# Patient Record
Sex: FEMALE | Race: WHITE | NOT HISPANIC OR LATINO | URBAN - METROPOLITAN AREA
[De-identification: names, ages, dates, MRNs, and addresses within clinical notes are randomized per-mention and may not be internally consistent; named-entity substitution may affect disease eponyms.]

---

## 2019-11-11 ENCOUNTER — EMERGENCY (EMERGENCY)
Facility: HOSPITAL | Age: 74
LOS: 1 days | Discharge: PSYCHIATRIC OR SUBSTANCE USE FACILITY | End: 2019-11-11
Attending: EMERGENCY MEDICINE | Admitting: EMERGENCY MEDICINE
Payer: COMMERCIAL

## 2019-11-11 VITALS
DIASTOLIC BLOOD PRESSURE: 83 MMHG | TEMPERATURE: 98 F | SYSTOLIC BLOOD PRESSURE: 129 MMHG | OXYGEN SATURATION: 95 % | HEIGHT: 63 IN | HEART RATE: 103 BPM | RESPIRATION RATE: 16 BRPM | WEIGHT: 119.93 LBS

## 2019-11-11 VITALS — HEART RATE: 76 BPM | DIASTOLIC BLOOD PRESSURE: 85 MMHG | SYSTOLIC BLOOD PRESSURE: 145 MMHG | RESPIRATION RATE: 18 BRPM

## 2019-11-11 DIAGNOSIS — F33.2 MAJOR DEPRESSIVE DISORDER, RECURRENT SEVERE WITHOUT PSYCHOTIC FEATURES: ICD-10-CM

## 2019-11-11 DIAGNOSIS — F41.1 GENERALIZED ANXIETY DISORDER: ICD-10-CM

## 2019-11-11 LAB
ALBUMIN SERPL ELPH-MCNC: 3.6 G/DL — SIGNIFICANT CHANGE UP (ref 3.3–5)
ALP SERPL-CCNC: 89 U/L — SIGNIFICANT CHANGE UP (ref 30–120)
ALT FLD-CCNC: 44 U/L DA — SIGNIFICANT CHANGE UP (ref 10–60)
ANION GAP SERPL CALC-SCNC: 8 MMOL/L — SIGNIFICANT CHANGE UP (ref 5–17)
APAP SERPL-MCNC: <1 UG/ML — LOW (ref 10–30)
APPEARANCE UR: CLEAR — SIGNIFICANT CHANGE UP
AST SERPL-CCNC: 28 U/L — SIGNIFICANT CHANGE UP (ref 10–40)
BASOPHILS # BLD AUTO: 0.06 K/UL — SIGNIFICANT CHANGE UP (ref 0–0.2)
BASOPHILS NFR BLD AUTO: 0.8 % — SIGNIFICANT CHANGE UP (ref 0–2)
BILIRUB SERPL-MCNC: 0.3 MG/DL — SIGNIFICANT CHANGE UP (ref 0.2–1.2)
BILIRUB UR-MCNC: NEGATIVE — SIGNIFICANT CHANGE UP
BUN SERPL-MCNC: 17 MG/DL — SIGNIFICANT CHANGE UP (ref 7–23)
CALCIUM SERPL-MCNC: 9.4 MG/DL — SIGNIFICANT CHANGE UP (ref 8.4–10.5)
CHLORIDE SERPL-SCNC: 104 MMOL/L — SIGNIFICANT CHANGE UP (ref 96–108)
CO2 SERPL-SCNC: 25 MMOL/L — SIGNIFICANT CHANGE UP (ref 22–31)
COLOR SPEC: YELLOW — SIGNIFICANT CHANGE UP
CREAT SERPL-MCNC: 0.98 MG/DL — SIGNIFICANT CHANGE UP (ref 0.5–1.3)
DIFF PNL FLD: ABNORMAL
EOSINOPHIL # BLD AUTO: 0.1 K/UL — SIGNIFICANT CHANGE UP (ref 0–0.5)
EOSINOPHIL NFR BLD AUTO: 1.3 % — SIGNIFICANT CHANGE UP (ref 0–6)
ETHANOL SERPL-MCNC: <3 MG/DL — SIGNIFICANT CHANGE UP (ref 0–3)
GLUCOSE SERPL-MCNC: 113 MG/DL — HIGH (ref 70–99)
GLUCOSE UR QL: NEGATIVE MG/DL — SIGNIFICANT CHANGE UP
HCT VFR BLD CALC: 43.2 % — SIGNIFICANT CHANGE UP (ref 34.5–45)
HGB BLD-MCNC: 14.3 G/DL — SIGNIFICANT CHANGE UP (ref 11.5–15.5)
IMM GRANULOCYTES NFR BLD AUTO: 0.3 % — SIGNIFICANT CHANGE UP (ref 0–1.5)
KETONES UR-MCNC: ABNORMAL
LEUKOCYTE ESTERASE UR-ACNC: ABNORMAL
LYMPHOCYTES # BLD AUTO: 1.82 K/UL — SIGNIFICANT CHANGE UP (ref 1–3.3)
LYMPHOCYTES # BLD AUTO: 24.4 % — SIGNIFICANT CHANGE UP (ref 13–44)
MCHC RBC-ENTMCNC: 28.6 PG — SIGNIFICANT CHANGE UP (ref 27–34)
MCHC RBC-ENTMCNC: 33.1 GM/DL — SIGNIFICANT CHANGE UP (ref 32–36)
MCV RBC AUTO: 86.4 FL — SIGNIFICANT CHANGE UP (ref 80–100)
MONOCYTES # BLD AUTO: 0.66 K/UL — SIGNIFICANT CHANGE UP (ref 0–0.9)
MONOCYTES NFR BLD AUTO: 8.9 % — SIGNIFICANT CHANGE UP (ref 2–14)
NEUTROPHILS # BLD AUTO: 4.79 K/UL — SIGNIFICANT CHANGE UP (ref 1.8–7.4)
NEUTROPHILS NFR BLD AUTO: 64.3 % — SIGNIFICANT CHANGE UP (ref 43–77)
NITRITE UR-MCNC: NEGATIVE — SIGNIFICANT CHANGE UP
NRBC # BLD: 0 /100 WBCS — SIGNIFICANT CHANGE UP (ref 0–0)
PCP SPEC-MCNC: SIGNIFICANT CHANGE UP
PH UR: 6 — SIGNIFICANT CHANGE UP (ref 5–8)
PLATELET # BLD AUTO: 349 K/UL — SIGNIFICANT CHANGE UP (ref 150–400)
POTASSIUM SERPL-MCNC: 3.5 MMOL/L — SIGNIFICANT CHANGE UP (ref 3.5–5.3)
POTASSIUM SERPL-SCNC: 3.5 MMOL/L — SIGNIFICANT CHANGE UP (ref 3.5–5.3)
PROT SERPL-MCNC: 7.4 G/DL — SIGNIFICANT CHANGE UP (ref 6–8.3)
PROT UR-MCNC: 30 MG/DL
RBC # BLD: 5 M/UL — SIGNIFICANT CHANGE UP (ref 3.8–5.2)
RBC # FLD: 13.1 % — SIGNIFICANT CHANGE UP (ref 10.3–14.5)
SALICYLATES SERPL-MCNC: 0.9 MG/DL — LOW (ref 2.8–20)
SODIUM SERPL-SCNC: 137 MMOL/L — SIGNIFICANT CHANGE UP (ref 135–145)
SP GR SPEC: 1.01 — SIGNIFICANT CHANGE UP (ref 1.01–1.02)
TSH SERPL-MCNC: 1.81 UIU/ML — SIGNIFICANT CHANGE UP (ref 0.27–4.2)
UROBILINOGEN FLD QL: NEGATIVE MG/DL — SIGNIFICANT CHANGE UP
WBC # BLD: 7.45 K/UL — SIGNIFICANT CHANGE UP (ref 3.8–10.5)
WBC # FLD AUTO: 7.45 K/UL — SIGNIFICANT CHANGE UP (ref 3.8–10.5)

## 2019-11-11 PROCEDURE — 93005 ELECTROCARDIOGRAM TRACING: CPT

## 2019-11-11 PROCEDURE — 99284 EMERGENCY DEPT VISIT MOD MDM: CPT

## 2019-11-11 PROCEDURE — 85027 COMPLETE CBC AUTOMATED: CPT

## 2019-11-11 PROCEDURE — 81001 URINALYSIS AUTO W/SCOPE: CPT

## 2019-11-11 PROCEDURE — 99285 EMERGENCY DEPT VISIT HI MDM: CPT

## 2019-11-11 PROCEDURE — 90792 PSYCH DIAG EVAL W/MED SRVCS: CPT | Mod: GT

## 2019-11-11 PROCEDURE — 80053 COMPREHEN METABOLIC PANEL: CPT

## 2019-11-11 PROCEDURE — 84443 ASSAY THYROID STIM HORMONE: CPT

## 2019-11-11 PROCEDURE — 36415 COLL VENOUS BLD VENIPUNCTURE: CPT

## 2019-11-11 PROCEDURE — 80307 DRUG TEST PRSMV CHEM ANLYZR: CPT

## 2019-11-11 PROCEDURE — 93010 ELECTROCARDIOGRAM REPORT: CPT

## 2019-11-11 RX ORDER — CLONAZEPAM 1 MG
0.5 TABLET ORAL ONCE
Refills: 0 | Status: DISCONTINUED | OUTPATIENT
Start: 2019-11-11 | End: 2019-11-11

## 2019-11-11 RX ADMIN — Medication 0.5 MILLIGRAM(S): at 17:36

## 2019-11-11 NOTE — ED BEHAVIORAL HEALTH ASSESSMENT NOTE - PSYCHIATRIC ISSUES AND PLAN (INCLUDE STANDING AND PRN MEDICATION)
defer to inpatient team as patient reports current medication to be ineffective.  Can give klonopin 0.25mg tid PRN for anxiety

## 2019-11-11 NOTE — ED ADULT TRIAGE NOTE - CHIEF COMPLAINT QUOTE
according to daughter pt is tearful & anxious. Not eating. Recently hospitalized @ [facility] for 1 week- "only saw Psy once & I think she needs meds adjusted"

## 2019-11-11 NOTE — ED BEHAVIORAL HEALTH ASSESSMENT NOTE - DESCRIPTION
ED Course: Pt was in ED ~2  hours prior to telepsychiatry consult which was requested at 14:50 and started at 15:06.  Per RN  Prescillo pt. arrived  brought in by daughter who reports that pt. was recently discharged from inpatient psych unit at [facility] and has been crying uncontrollably and not eating.  Pt. has been cooperative, complied with triage process; provided routine labs and urine willingly, allowed gowning and security wanding without incident. Pt. has been in behavioral control and has not required medication or security intervention.  Pt. endorses SI, states “I can’t do this anymore”, denies HI, denies AH/VH. Per RN Prescillo pt. eye contact fair, speech noted to be soft in volume but of normal rate, thoughts seeming to be illogical.  Pt’s mood described as notably anxious with congruent affect; pt. very tearful. Pt. had also been tremulous initially but has been resting comfortably. Pt’s daughter was at bedside earlier in ED stay but has since departed to [facility] for pt’s grandchildren.  Interactions between pt. and daughter appeared to be supportive per RN. bladder ca 1.5 years ago sp resection, gallbladder removal, glaucoma per HPI

## 2019-11-11 NOTE — ED ADULT NURSE NOTE - NSIMPLEMENTINTERV_GEN_ALL_ED
Implemented All Fall Risk Interventions:  [place] to call system. Call bell, personal items and telephone within reach. Instruct patient to call for assistance. Room bathroom lighting operational. Non-slip footwear when patient is off stretcher. Physically safe environment: no spills, clutter or unnecessary equipment. Stretcher in lowest position, wheels locked, appropriate side rails in place. Provide visual cue, wrist band, yellow gown, etc. Monitor gait and stability. Monitor for mental status changes and reorient to person, place, and time. Review medications for side effects contributing to fall risk. Reinforce activity limits and safety measures with patient and family.

## 2019-11-11 NOTE — ED BEHAVIORAL HEALTH ASSESSMENT NOTE - ACTIVATING EVENTS/STRESSORS
Perceived burden on family or others/Non-compliant or not receiving treatment/Recent inpatient discharge/Current or pending social isolation/Hopeless about or dissatisfied with provider or treatment/Inadequate social supports

## 2019-11-11 NOTE — ED BEHAVIORAL HEALTH ASSESSMENT NOTE - HPI (INCLUDE ILLNESS QUALITY, SEVERITY, DURATION, TIMING, CONTEXT, MODIFYING FACTORS, ASSOCIATED SIGNS AND SYMPTOMS)
74 year old white female, retired, domiciled along, h/o depression, anxiety, no prior SA, one prior admission at [facility] (10/19/19 - 10/25/19) for anxiety and SI, 1 CPEP visit at [facility] few days ago, no substance use/legal history/ACS involvement/aggression, with history of bladder cancer (1.5 years ago) sp surgery, glaucoma, gallbladder removal,  presents to ED for worsening anxiety, crying, and stating "I don't want to live anymore".      Per information given to ED provider by patient daughter, prior to admission at [facility], patient was seen by PCP 6 different times and then recommended to go to [facility]. had multiple tests performed which did not show anything.  Patient admitted to starving herself in attempt to kill herself, was transferred to [facility] psych [facility] and admitted for a week.   She diagnosed with depression and anxiety, was started on trazadone, remeron, and xanax, she did not follow up with psychiatrist and counselor outpatient as instructed in discharge plan as patient is now refusing to leave house or go to the doctor's). Her arranged for her to have an aide with her. a few days ago, patient had crying spell and couldn't stop crying. Aid called EMS and transferred to [place], she was observed in CPEP for 48 hours, but not admitted to psych [facility].  Daughter took patient home and has been living with her past few days. woke up today and can't stop crying and ranting per daughter, continues to tell family every day that "I don't want to live anymore". plan is to starve herself.  Currently refusing to eat. daughter reports 40 lb weight loss in past few month.      Patient is very anxious on evaluation, but aaox3, no signs of confusion or delirium.  She states started having a few surgical operation over the past 2 years, and noticed feeling more anxious and depressed about few months ago.  She describes having constant worries over everything, racing thoughts, disastrous thinking, restless, frequent crying spells, unable to sleep at night due to racing thought, and feels very tired during the day.  She also reports feeling depressed, lack of energy, motivation, hopelessness, and does not feel hungry nor want to eat.  She has been thinking about starving herself, and has lost 40 lbs over the past few months.  She denied other plan/intent.  Denied HI/AVH.  She states feeling frightened when she was at [facility] and at [place] ED due to other patients were screaming and yelling.  She reports being prescribed Xanax, Remeron, and trazodone, and reported slight improvement in sleep, and felt ok at night when she took her medication.  However, as soon as she woke up in the morning, she reported extreme anxiety, that prevents her from normal daily function such as caring for self, cooking, care for ADL's, or going out to see doctors.  She reports reason for this visit to be "my daughter does not want to deal with me anymore.  I am such a burden."  Patient also reported stopped taking psychiatric medication few days after she was discharged from [facility].     Collateral information was obtained from [facility] psychiatrist, who states that patient was transferred to [facility] from [facility] for psychiatric admission on 10/19 for similar presentation of having increased anxiety, feeling frightened, tearful, and depression with SI.  She was started on Remeron, then ambien and vistaril for sleep which did not work and was switched to trazodone, and Xanax PRN for anxiety.  She was referred for outpatient follow up, at [facility] in [facility].  Her treating physician at [facility] was Dr. Vail and her discharge diagnosis was MDD with anxious distress, and discharge medication are as follow:  Remeron 15mg qhs, Trazodone 50mg qhs, Xanax 0.25mg tid PRN anxiety    Attempted to contact patient's daughter but voicemail is full.

## 2019-11-11 NOTE — ED ADULT NURSE NOTE - CHIEF COMPLAINT QUOTE
according to daughter pt is tearful & anxious. Not eating. Recently hospitalized @ [facility] for 1 week- "only saw Psy once & I think she needs meds adjusted"

## 2019-11-11 NOTE — ED BEHAVIORAL HEALTH ASSESSMENT NOTE - SUMMARY
74 year old white female, retired, domiciled along, h/o depression, anxiety, no prior SA, one prior admission at [facility] (10/19/19 - 10/25/19) for anxiety and SI, 1 CPEP visit at [facility] few days ago, no substance use/legal history/ACS involvement/aggression, with history of bladder cancer (1.5 years ago) sp surgery, glaucoma, gallbladder removal,  presents to ED for worsening anxiety, crying, and stating "I don't want to live anymore".   Patient presented with overt anxiety, tearfulness, endorsed to depressed mood with SI to starve herself, anxiety and depression are at an extent impairing patient's function as she lost 40 lbs within few months due to starvation, inability to sleep, does not get out of house due to anxiety, and unable to care for self.  based on above findings, patient meets criteria for inpatient level of psychiatric care under involuntary legal status for safety and stabilization .

## 2019-11-11 NOTE — ED PROVIDER NOTE - PROGRESS NOTE DETAILS
Matthieu HENDRICKS for ED attending, Dr. Camarena : 75 y/o female recently dx with depression, was hospitalized at [facility], started on trazodone, remeron, xanax, BIB daughter for refusal to take meds or follow up with outpt psych care, pt stated that she will starve herself to death because she doesnt want to live anymore. Hx comes from daughter. Pt not answering questions.  PE: VSS, afebrile, NAD, heart and lungs nl, abd soft, neuro no deficits  plan - medical clearance and telepsych eval. spoke with telepsych. will consult. accepted for transfer to [facility]

## 2019-11-11 NOTE — ED BEHAVIORAL HEALTH ASSESSMENT NOTE - RISK ASSESSMENT
Patient has elevated acute and chronic risk due to active SI with plan and intent, unable to contract for safety, severe anxiety, impaired function, noncompliant with medication/treatment, feeling hopeless, isolative. High Acute Suicide Risk

## 2019-11-11 NOTE — ED BEHAVIORAL HEALTH ASSESSMENT NOTE - SUICIDE RISK FACTORS
Anhedonia/Unable to engage in safety planning/Agitation/Severe Anxiety/Panic/Insomnia/Mood Disorder current/past/Current mood episode/Hopelessness or despair

## 2019-11-11 NOTE — ED PROVIDER NOTE - CLINICAL SUMMARY MEDICAL DECISION MAKING FREE TEXT BOX
From: Rachel Ayala  To: Sis Medina MD  Sent: 6/16/2017 9:03 PM CDT  Subject: tests    I did have my fasting lipid panel done-forgot.Beatriz Ayala  
Message seen by MD.   
Routed to Dr. Medina as FREDIS  
worsening anxiety/depression, +SI, needs medical clearance for telepsychiatry consult

## 2019-11-11 NOTE — ED BEHAVIORAL HEALTH ASSESSMENT NOTE - DETAILS
[facility] 10/19-10/25/19 Patient reports SI with plan and intent to starve herself x a few months handoff provided to [place] ED providers are notified see HPI

## 2019-11-12 RX ORDER — ALPRAZOLAM 0.25 MG
1 TABLET ORAL
Qty: 0 | Refills: 0 | DISCHARGE

## 2019-11-12 RX ORDER — MIRTAZAPINE 45 MG/1
1 TABLET, ORALLY DISINTEGRATING ORAL
Qty: 0 | Refills: 0 | DISCHARGE

## 2019-11-12 RX ORDER — TRAZODONE HCL 50 MG
0 TABLET ORAL
Qty: 0 | Refills: 0 | DISCHARGE

## 2019-11-28 ENCOUNTER — EMERGENCY (EMERGENCY)
Facility: HOSPITAL | Age: 74
LOS: 1 days | Discharge: HOME | End: 2019-11-28
Attending: EMERGENCY MEDICINE
Payer: COMMERCIAL

## 2019-11-28 VITALS
OXYGEN SATURATION: 98 % | DIASTOLIC BLOOD PRESSURE: 78 MMHG | RESPIRATION RATE: 19 BRPM | HEART RATE: 78 BPM | SYSTOLIC BLOOD PRESSURE: 170 MMHG

## 2019-11-28 VITALS
HEART RATE: 64 BPM | DIASTOLIC BLOOD PRESSURE: 71 MMHG | SYSTOLIC BLOOD PRESSURE: 134 MMHG | RESPIRATION RATE: 18 BRPM | OXYGEN SATURATION: 96 % | TEMPERATURE: 98 F

## 2019-11-28 LAB
ALBUMIN SERPL ELPH-MCNC: 3.4 G/DL — SIGNIFICANT CHANGE UP (ref 3.3–5.2)
ALP SERPL-CCNC: 86 U/L — SIGNIFICANT CHANGE UP (ref 40–120)
ALT FLD-CCNC: 18 U/L — SIGNIFICANT CHANGE UP
ANION GAP SERPL CALC-SCNC: 15 MMOL/L — SIGNIFICANT CHANGE UP (ref 5–17)
AST SERPL-CCNC: 22 U/L — SIGNIFICANT CHANGE UP
BILIRUB SERPL-MCNC: 0.4 MG/DL — SIGNIFICANT CHANGE UP (ref 0.4–2)
BUN SERPL-MCNC: 16 MG/DL — SIGNIFICANT CHANGE UP (ref 8–20)
CALCIUM SERPL-MCNC: 8.9 MG/DL — SIGNIFICANT CHANGE UP (ref 8.6–10.2)
CHLORIDE SERPL-SCNC: 98 MMOL/L — SIGNIFICANT CHANGE UP (ref 98–107)
CO2 SERPL-SCNC: 22 MMOL/L — SIGNIFICANT CHANGE UP (ref 22–29)
CREAT SERPL-MCNC: 0.87 MG/DL — SIGNIFICANT CHANGE UP (ref 0.5–1.3)
GLUCOSE SERPL-MCNC: 100 MG/DL — SIGNIFICANT CHANGE UP (ref 70–115)
HCT VFR BLD CALC: 43.4 % — SIGNIFICANT CHANGE UP (ref 34.5–45)
HGB BLD-MCNC: 13.9 G/DL — SIGNIFICANT CHANGE UP (ref 11.5–15.5)
MAGNESIUM SERPL-MCNC: 1.9 MG/DL — SIGNIFICANT CHANGE UP (ref 1.6–2.6)
MCHC RBC-ENTMCNC: 28.1 PG — SIGNIFICANT CHANGE UP (ref 27–34)
MCHC RBC-ENTMCNC: 32 GM/DL — SIGNIFICANT CHANGE UP (ref 32–36)
MCV RBC AUTO: 87.9 FL — SIGNIFICANT CHANGE UP (ref 80–100)
PHOSPHATE SERPL-MCNC: 2.3 MG/DL — LOW (ref 2.4–4.7)
PLATELET # BLD AUTO: 273 K/UL — SIGNIFICANT CHANGE UP (ref 150–400)
POTASSIUM SERPL-MCNC: 3.9 MMOL/L — SIGNIFICANT CHANGE UP (ref 3.5–5.3)
POTASSIUM SERPL-SCNC: 3.9 MMOL/L — SIGNIFICANT CHANGE UP (ref 3.5–5.3)
PROT SERPL-MCNC: 6.5 G/DL — LOW (ref 6.6–8.7)
RBC # BLD: 4.94 M/UL — SIGNIFICANT CHANGE UP (ref 3.8–5.2)
RBC # FLD: 12.9 % — SIGNIFICANT CHANGE UP (ref 10.3–14.5)
SODIUM SERPL-SCNC: 135 MMOL/L — SIGNIFICANT CHANGE UP (ref 135–145)
WBC # BLD: 7.28 K/UL — SIGNIFICANT CHANGE UP (ref 3.8–10.5)
WBC # FLD AUTO: 7.28 K/UL — SIGNIFICANT CHANGE UP (ref 3.8–10.5)

## 2019-11-28 PROCEDURE — 96360 HYDRATION IV INFUSION INIT: CPT

## 2019-11-28 PROCEDURE — 70450 CT HEAD/BRAIN W/O DYE: CPT | Mod: 26

## 2019-11-28 PROCEDURE — 83735 ASSAY OF MAGNESIUM: CPT

## 2019-11-28 PROCEDURE — 71046 X-RAY EXAM CHEST 2 VIEWS: CPT | Mod: 26

## 2019-11-28 PROCEDURE — 99284 EMERGENCY DEPT VISIT MOD MDM: CPT

## 2019-11-28 PROCEDURE — 85027 COMPLETE CBC AUTOMATED: CPT

## 2019-11-28 PROCEDURE — 84100 ASSAY OF PHOSPHORUS: CPT

## 2019-11-28 PROCEDURE — 93010 ELECTROCARDIOGRAM REPORT: CPT

## 2019-11-28 PROCEDURE — 99284 EMERGENCY DEPT VISIT MOD MDM: CPT | Mod: 25

## 2019-11-28 PROCEDURE — 80053 COMPREHEN METABOLIC PANEL: CPT

## 2019-11-28 PROCEDURE — 93005 ELECTROCARDIOGRAM TRACING: CPT

## 2019-11-28 PROCEDURE — 36415 COLL VENOUS BLD VENIPUNCTURE: CPT

## 2019-11-28 PROCEDURE — 71046 X-RAY EXAM CHEST 2 VIEWS: CPT

## 2019-11-28 PROCEDURE — 70450 CT HEAD/BRAIN W/O DYE: CPT

## 2019-11-28 RX ORDER — SODIUM CHLORIDE 9 MG/ML
1000 INJECTION INTRAMUSCULAR; INTRAVENOUS; SUBCUTANEOUS ONCE
Refills: 0 | Status: COMPLETED | OUTPATIENT
Start: 2019-11-28 | End: 2019-11-28

## 2019-11-28 RX ADMIN — SODIUM CHLORIDE 1000 MILLILITER(S): 9 INJECTION INTRAMUSCULAR; INTRAVENOUS; SUBCUTANEOUS at 13:52

## 2019-11-28 RX ADMIN — SODIUM CHLORIDE 1000 MILLILITER(S): 9 INJECTION INTRAMUSCULAR; INTRAVENOUS; SUBCUTANEOUS at 14:52

## 2019-11-28 NOTE — ED ADULT NURSE NOTE - CHIEF COMPLAINT QUOTE
presents from [facility] after witnessed fall with staff. patient states they were using scale when she became lightheaded and felt like she was going to pass out. EMS states positive for orthostatic hypotension, staff states patient did hit her head but negative LOC or blood thinners.

## 2019-11-28 NOTE — ED ADULT NURSE NOTE - NSIMPLEMENTINTERV_GEN_ALL_ED
Implemented All Universal Safety Interventions:  [place] to call system. Call bell, personal items and telephone within reach. Instruct patient to call for assistance. Room bathroom lighting operational. Non-slip footwear when patient is off stretcher. Physically safe environment: no spills, clutter or unnecessary equipment. Stretcher in lowest position, wheels locked, appropriate side rails in place.

## 2019-11-28 NOTE — ED PROVIDER NOTE - PATIENT PORTAL LINK FT
You can access the FollowMyHealth Patient Portal offered by [facility] by registering at the following website: [email or web address]. By joining [facility]’s FollowMyHealth portal, you will also be able to view your health information using other applications (apps) compatible with our system.

## 2019-11-28 NOTE — ED ADULT TRIAGE NOTE - CHIEF COMPLAINT QUOTE
presents from [facility] after witnessed fall with staff. patient states they were using scale when she became lightheaded and felt like she was going to pass out. EMS states positive for orthostatic hypotension, staff states patient did hit her head but negative LOC or blood thinners.

## 2019-11-28 NOTE — ED PROVIDER NOTE - PHYSICAL EXAMINATION
Gen: NAD  Head: NCAT  HEENT: PERRL, dry MM, normal conjunctiva, anicteric, neck supple  Lung: CTAB, no adventitious sounds  CV: RRR, no murmurs, rubs or gallops  Abd: soft, NTND, no rebound or guarding, no CVAT  MSK: No edema, no visible deformities  Neuro: No focal neurologic deficits. CN II-XII grossly intact. 5/5 strength and normal sensation in all extremities.  Skin: Warm and dry, no evidence of rash  Psych: sad mood and affect

## 2019-11-28 NOTE — ED PROVIDER NOTE - NSFOLLOWUPINSTRUCTIONS_ED_ALL_ED_FT
You were seen in the ER for syncopal episode.  You were given fluids.  Drink lots of fluids.  Labs were within normal limits.  Return to ER for life threatening emergencies.  Follow up with your doctor.

## 2019-11-28 NOTE — ED ADULT NURSE NOTE - OBJECTIVE STATEMENT
74 year old female, PMHx of depression, presents to the ED from [facility] for a syncopal episode. Patient states that she was laying down and then stood up and felt lightheaded before passing out. Patient A/Ox4 at this time. Patient states that she thinks that she may have hit her head. No obvious signs of trauma.

## 2019-11-28 NOTE — ED ADULT NURSE REASSESSMENT NOTE - NS ED NURSE REASSESS COMMENT FT1
pt Able to ambulate without assistance to the bathroom, [facility] at ps bedside. MD Erazo advised on pts ability to ambulate

## 2019-11-28 NOTE — ED PROVIDER NOTE - CLINICAL SUMMARY MEDICAL DECISION MAKING FREE TEXT BOX
Witnessed syncopal episode at [facility]. When lying to standing, pt felt dizzy and syncopized. Will get labs, electrolytes. Pt does not have neurologic deficits, however will get CT head in setting of transient LOC and fall Witnessed syncopal episode at [facility]. When lying to standing, pt felt dizzy and syncopized. Will get labs, electrolytes. Pt does not have neurologic deficits, however will get CT head in setting of transient LOC and fall. recent negative cardiac workup. now feels well.

## 2019-11-28 NOTE — ED ADULT NURSE NOTE - CHPI ED NUR SYMPTOMS NEG
no decreased eating/drinking/no nausea/no chills/no pain/no vomiting/no weakness/no tingling/no fever

## 2019-11-28 NOTE — ED PROVIDER NOTE - OBJECTIVE STATEMENT
73yo F with hx of anxiety, depression presents today with syncopal episode. Was lying down when she stood up all of a sudden to take a weight she felt dizzy and fainted. "lost consciousness" Did hit her head. Not on any anticoagulation. Lost consciousness for few seconds. Per EMS, SBP dropped 30-40 when standing up. Had similar episode few weeks ago, was evaluated by cardiologist and had stress test / echo done 3-4 weeks ago at [facility]. Denies chest pain, shortness of breath. In normal state of health prior to incident. 75yo F with hx of anxiety, depression presents today with syncopal episode. Was lying down when she stood up all of a sudden to take a weight she felt dizzy and fainted. "lost consciousness" Did hit her head. Not on any anticoagulation. Lost consciousness for few seconds. Per EMS, SBP dropped 30-40 when standing up. Had similar episode few weeks ago, was evaluated by cardiologist and had stress test / echo done 3-4 weeks ago at [place]. Denies chest pain, shortness of breath. In normal state of health prior to incident. Pt now feeling well with no complaints and wishes to be discharged.

## 2019-11-28 NOTE — ED ADULT NURSE REASSESSMENT NOTE - NS ED NURSE REASSESS COMMENT FT1
Pt resting comfortably, no distress noted , no chest pain  reported , pt awaiting transport back to  [facility], verbal report given to receiving nurse from [facility]

## 2019-11-28 NOTE — ED PROVIDER NOTE - PROGRESS NOTE DETAILS
Pt dry, sad affect. Agrees with plan. Orthostatic vitals positive for orthostasis. When standing up, SBP dropped to 80 from 130 with slight rise in HR from 60 to 78. Pt feels better, able to walk without dizziness or difficulty. Will DC with f/u PMD/cardiology. Pt dry, Agrees with plan. Pt feels better, able to walk without dizziness or difficulty. Will DC with f/u PMD/cardiology. VS improved. tolerating PO

## 2019-11-28 NOTE — ED PROVIDER NOTE - NS ED ROS FT
ROS: denies HA, weakness, fevers/chills, nausea/vomiting, chest pain, SOB, diaphoresis, abdominal pain, diarrhea, joint pain, neuro deficits, dysuria/hematuria, rash    +dizziness, syncope

## 2022-03-02 NOTE — ED BEHAVIORAL HEALTH ASSESSMENT NOTE - DANGER TO SELF; MENTAL ILLNESS EXPECTED TO RESPOND TO INPATIENT CARE
[FreeTextEntry1] : Labs 9/28/18\par Cr 1.09\par LDl chol 67\par Tg 188\par A1c 7.8%\par TSH 4.720\par urine microalb/Cr 37\par \par Labs 1/24/19\par Cr 1.32, GFR 54\par LDL chol 77, Tg 163\par A1c 6.6%\par TSH 2.780, FT4 1.45\par urine microalb/Cr 31\par \par Labs 6/25/19\par Gluc 133, A1c 7.4\par CR 1.32, GFR 54\par LDL 67, HDL 40, Tg 132\par urine alb/Cr 50\par \par Labs 10/30/19\par Gluc 134, A1c 7.3%\par Cr 1.32, GFR 54\par ALT 57\par LDL 54, HDL 43, Tg 154\par B12 866\par \par labs 11/18/20 \par gluc 191, A1c 7.7\par Cr 1.21. GFR 59\par LDL 84, Tg 180, HDL 40\par TSH 2.520\par urine alb/Cr 109\par \par Labs 2/23/21\par Gluc 212, A1c 7.9\par Cr 1.08, GFR 68\par LDL 51, Tg 180, HDL 39\par TSH 2.82\par B12 679\par urine alb/Cr 115\par \par Labs 5/26/21\par Gluc 143, A1c 8.4\par Cr 1.06, GFR 69\par LDL 59, HDL 38, Tg 154\par \par Labs 9/17/21\par Gluc 107, A1c 6.5\par Cr 0.97, GFR 77\par HDL 39, LDL 53, Tg 94\par TSH 2.68\par Hb 12.5\par B12 734\par urine alb/Cr 274\par \par labs 3/1/22 \par Cr 1.15, GFR 67\par A1c 6.7,gluc 110\par LDL 60, Tg 191\par Na 132
Suicidal behavior

## 2022-03-24 NOTE — ED ADULT NURSE NOTE - NSFALLRSKASSISTTYPE_ED_ALL_ED
VITAL SIGNS: I have reviewed nursing notes and confirm.  CONSTITUTIONAL: contracted, cachectic, ill-appearing  SKIN:  skin exam is warm and dry, no acute rash.    HEAD: Normocephalic; atraumatic.  EYES:  conjunctiva and sclera clear.  ENT: No nasal discharge; airway clear.  NECK: Supple; non tender.  CARD: S1, S2 normal; no murmurs, gallops, or rubs. tachycardic  RESP: No wheezes, rales or rhonchi.  ABD: Normal bowel sounds; soft; non-distended; non-tender  EXT: Normal ROM.  No clubbing, cyanosis or edema.   LYMPH: No acute cervical adenopathy.  NEURO: Alert  PSYCH: Cooperative Walking/Standing

## 2024-03-27 NOTE — ED ADULT NURSE NOTE - NSFALLRSKPASTHIST_ED_ALL_ED
Genetic counseling referral faxed to [facility] with confirmation of receipt.  Scanned to media.   
no
